# Patient Record
Sex: FEMALE | URBAN - METROPOLITAN AREA
[De-identification: names, ages, dates, MRNs, and addresses within clinical notes are randomized per-mention and may not be internally consistent; named-entity substitution may affect disease eponyms.]

---

## 2022-03-29 DIAGNOSIS — Z11.59 ENCOUNTER FOR SCREENING FOR OTHER VIRAL DISEASES: Primary | ICD-10-CM

## 2022-03-30 RX ORDER — MEDROXYPROGESTERONE ACETATE 10 MG
20 TABLET ORAL 2 TIMES DAILY
COMMUNITY

## 2022-03-30 RX ORDER — CETIRIZINE HYDROCHLORIDE 10 MG/1
10 TABLET ORAL DAILY
COMMUNITY

## 2022-03-30 RX ORDER — ALBUTEROL SULFATE 90 UG/1
2 AEROSOL, METERED RESPIRATORY (INHALATION) EVERY 6 HOURS
COMMUNITY

## 2022-03-30 RX ORDER — LEVOTHYROXINE SODIUM 150 UG/1
150 TABLET ORAL DAILY
COMMUNITY

## 2022-04-02 ENCOUNTER — LAB (OUTPATIENT)
Dept: LAB | Facility: CLINIC | Age: 52
End: 2022-04-02
Attending: STUDENT IN AN ORGANIZED HEALTH CARE EDUCATION/TRAINING PROGRAM
Payer: COMMERCIAL

## 2022-04-02 DIAGNOSIS — Z11.59 ENCOUNTER FOR SCREENING FOR OTHER VIRAL DISEASES: ICD-10-CM

## 2022-04-02 PROCEDURE — U0005 INFEC AGEN DETEC AMPLI PROBE: HCPCS

## 2022-04-02 PROCEDURE — U0003 INFECTIOUS AGENT DETECTION BY NUCLEIC ACID (DNA OR RNA); SEVERE ACUTE RESPIRATORY SYNDROME CORONAVIRUS 2 (SARS-COV-2) (CORONAVIRUS DISEASE [COVID-19]), AMPLIFIED PROBE TECHNIQUE, MAKING USE OF HIGH THROUGHPUT TECHNOLOGIES AS DESCRIBED BY CMS-2020-01-R: HCPCS

## 2022-04-03 LAB — SARS-COV-2 RNA RESP QL NAA+PROBE: NEGATIVE

## 2022-04-04 ENCOUNTER — ANESTHESIA EVENT (OUTPATIENT)
Dept: SURGERY | Facility: AMBULATORY SURGERY CENTER | Age: 52
End: 2022-04-04

## 2022-04-04 NOTE — PROGRESS NOTES
"Preoperative Note     50yo with persistent PMB presenting for scheduled hysteroscopy, D&C insertion.   Patient with persistent postmenopausal bleeding for 2-3 months. EMB benign with  endometrium. Refractory to PO progesterone.   Patient no longer using hormone pellets, last insertion was 9/2021.   TVUS: Uterus 5.07 x 3.93 x 9cm, EE 12.83mm - thickened and heterogeneous.    PMH: asthma, well controlled; h/o thyroid dysfunction now normal  All: NKDA    PE  /67   Temp 97.5  F (36.4  C) (Temporal)   Resp 16   Ht 1.626 m (5' 4\")   Wt 86.6 kg (191 lb)   SpO2 98%   Breastfeeding No   BMI 32.79 kg/m    Gen: well appearing  Resp: CTA b/l  CV: RRR  Abd: soft, NT, ND    Risks/benefits/alternatives to today's procedure reviewed, informed consent obtained.   Plan for hysteroscopy/D&C. No longer wants LNG-IUD. Will taper her PO progesterone.     Le Cornell MD    "

## 2022-04-05 ENCOUNTER — HOSPITAL ENCOUNTER (OUTPATIENT)
Facility: AMBULATORY SURGERY CENTER | Age: 52
Discharge: HOME | End: 2022-04-05
Attending: STUDENT IN AN ORGANIZED HEALTH CARE EDUCATION/TRAINING PROGRAM
Payer: COMMERCIAL

## 2022-04-05 ENCOUNTER — ANESTHESIA (OUTPATIENT)
Dept: SURGERY | Facility: AMBULATORY SURGERY CENTER | Age: 52
End: 2022-04-05

## 2022-04-05 VITALS
WEIGHT: 191 LBS | SYSTOLIC BLOOD PRESSURE: 125 MMHG | TEMPERATURE: 97 F | BODY MASS INDEX: 32.61 KG/M2 | HEART RATE: 75 BPM | DIASTOLIC BLOOD PRESSURE: 69 MMHG | HEIGHT: 64 IN | OXYGEN SATURATION: 96 % | RESPIRATION RATE: 16 BRPM

## 2022-04-05 DIAGNOSIS — N95.0 PMB (POSTMENOPAUSAL BLEEDING): ICD-10-CM

## 2022-04-05 RX ORDER — MEPERIDINE HYDROCHLORIDE 25 MG/ML
12.5 INJECTION INTRAMUSCULAR; INTRAVENOUS; SUBCUTANEOUS
Status: DISCONTINUED | OUTPATIENT
Start: 2022-04-05 | End: 2022-04-06 | Stop reason: HOSPADM

## 2022-04-05 RX ORDER — PROPOFOL 10 MG/ML
INJECTION, EMULSION INTRAVENOUS CONTINUOUS PRN
Status: DISCONTINUED | OUTPATIENT
Start: 2022-04-05 | End: 2022-04-05

## 2022-04-05 RX ORDER — FENTANYL CITRATE 0.05 MG/ML
25 INJECTION, SOLUTION INTRAMUSCULAR; INTRAVENOUS EVERY 5 MIN PRN
Status: DISCONTINUED | OUTPATIENT
Start: 2022-04-05 | End: 2022-04-06 | Stop reason: HOSPADM

## 2022-04-05 RX ORDER — FENTANYL CITRATE 50 UG/ML
INJECTION, SOLUTION INTRAMUSCULAR; INTRAVENOUS PRN
Status: DISCONTINUED | OUTPATIENT
Start: 2022-04-05 | End: 2022-04-05

## 2022-04-05 RX ORDER — LIDOCAINE 40 MG/G
CREAM TOPICAL
Status: DISCONTINUED | OUTPATIENT
Start: 2022-04-05 | End: 2022-04-06 | Stop reason: HOSPADM

## 2022-04-05 RX ORDER — ACETAMINOPHEN 325 MG/1
975 TABLET ORAL ONCE
Status: DISCONTINUED | OUTPATIENT
Start: 2022-04-05 | End: 2022-04-06 | Stop reason: HOSPADM

## 2022-04-05 RX ORDER — HYDROMORPHONE HCL IN WATER/PF 6 MG/30 ML
0.2 PATIENT CONTROLLED ANALGESIA SYRINGE INTRAVENOUS EVERY 5 MIN PRN
Status: DISCONTINUED | OUTPATIENT
Start: 2022-04-05 | End: 2022-04-06 | Stop reason: HOSPADM

## 2022-04-05 RX ORDER — FENTANYL CITRATE 0.05 MG/ML
25 INJECTION, SOLUTION INTRAMUSCULAR; INTRAVENOUS
Status: DISCONTINUED | OUTPATIENT
Start: 2022-04-05 | End: 2022-04-06 | Stop reason: HOSPADM

## 2022-04-05 RX ORDER — IBUPROFEN 800 MG/1
800 TABLET, FILM COATED ORAL EVERY 6 HOURS PRN
Qty: 30 TABLET | Refills: 0 | Status: SHIPPED | OUTPATIENT
Start: 2022-04-05

## 2022-04-05 RX ORDER — OXYCODONE HYDROCHLORIDE 5 MG/1
5 TABLET ORAL
Status: DISCONTINUED | OUTPATIENT
Start: 2022-04-05 | End: 2022-04-06 | Stop reason: HOSPADM

## 2022-04-05 RX ORDER — SODIUM CHLORIDE, SODIUM LACTATE, POTASSIUM CHLORIDE, CALCIUM CHLORIDE 600; 310; 30; 20 MG/100ML; MG/100ML; MG/100ML; MG/100ML
INJECTION, SOLUTION INTRAVENOUS CONTINUOUS
Status: DISCONTINUED | OUTPATIENT
Start: 2022-04-05 | End: 2022-04-06 | Stop reason: HOSPADM

## 2022-04-05 RX ORDER — ONDANSETRON 2 MG/ML
4 INJECTION INTRAMUSCULAR; INTRAVENOUS EVERY 30 MIN PRN
Status: DISCONTINUED | OUTPATIENT
Start: 2022-04-05 | End: 2022-04-06 | Stop reason: HOSPADM

## 2022-04-05 RX ORDER — ACETAMINOPHEN 325 MG/1
975 TABLET ORAL EVERY 6 HOURS PRN
Qty: 50 TABLET | Refills: 0 | Status: SHIPPED | OUTPATIENT
Start: 2022-04-05

## 2022-04-05 RX ORDER — KETAMINE HYDROCHLORIDE 50 MG/ML
INJECTION, SOLUTION INTRAMUSCULAR; INTRAVENOUS PRN
Status: DISCONTINUED | OUTPATIENT
Start: 2022-04-05 | End: 2022-04-05

## 2022-04-05 RX ORDER — ACETAMINOPHEN 325 MG/1
975 TABLET ORAL ONCE
Status: COMPLETED | OUTPATIENT
Start: 2022-04-05 | End: 2022-04-05

## 2022-04-05 RX ORDER — ONDANSETRON 2 MG/ML
INJECTION INTRAMUSCULAR; INTRAVENOUS PRN
Status: DISCONTINUED | OUTPATIENT
Start: 2022-04-05 | End: 2022-04-05

## 2022-04-05 RX ORDER — IBUPROFEN 800 MG/1
800 TABLET, FILM COATED ORAL ONCE
Status: DISCONTINUED | OUTPATIENT
Start: 2022-04-05 | End: 2022-04-06 | Stop reason: HOSPADM

## 2022-04-05 RX ORDER — DEXAMETHASONE SODIUM PHOSPHATE 4 MG/ML
INJECTION, SOLUTION INTRA-ARTICULAR; INTRALESIONAL; INTRAMUSCULAR; INTRAVENOUS; SOFT TISSUE PRN
Status: DISCONTINUED | OUTPATIENT
Start: 2022-04-05 | End: 2022-04-05

## 2022-04-05 RX ORDER — ONDANSETRON 4 MG/1
4 TABLET, ORALLY DISINTEGRATING ORAL EVERY 30 MIN PRN
Status: DISCONTINUED | OUTPATIENT
Start: 2022-04-05 | End: 2022-04-06 | Stop reason: HOSPADM

## 2022-04-05 RX ORDER — KETOROLAC TROMETHAMINE 30 MG/ML
INJECTION, SOLUTION INTRAMUSCULAR; INTRAVENOUS PRN
Status: DISCONTINUED | OUTPATIENT
Start: 2022-04-05 | End: 2022-04-05

## 2022-04-05 RX ORDER — LIDOCAINE HYDROCHLORIDE 10 MG/ML
INJECTION, SOLUTION EPIDURAL; INFILTRATION; INTRACAUDAL; PERINEURAL PRN
Status: DISCONTINUED | OUTPATIENT
Start: 2022-04-05 | End: 2022-04-05 | Stop reason: HOSPADM

## 2022-04-05 RX ORDER — OXYCODONE HYDROCHLORIDE 5 MG/1
5 TABLET ORAL EVERY 4 HOURS PRN
Status: DISCONTINUED | OUTPATIENT
Start: 2022-04-05 | End: 2022-04-06 | Stop reason: HOSPADM

## 2022-04-05 RX ORDER — LIDOCAINE HYDROCHLORIDE 20 MG/ML
INJECTION, SOLUTION INFILTRATION; PERINEURAL PRN
Status: DISCONTINUED | OUTPATIENT
Start: 2022-04-05 | End: 2022-04-05

## 2022-04-05 RX ADMIN — DEXAMETHASONE SODIUM PHOSPHATE 6 MG: 4 INJECTION, SOLUTION INTRA-ARTICULAR; INTRALESIONAL; INTRAMUSCULAR; INTRAVENOUS; SOFT TISSUE at 07:35

## 2022-04-05 RX ADMIN — SODIUM CHLORIDE, SODIUM LACTATE, POTASSIUM CHLORIDE, CALCIUM CHLORIDE: 600; 310; 30; 20 INJECTION, SOLUTION INTRAVENOUS at 06:52

## 2022-04-05 RX ADMIN — PROPOFOL 200 MCG/KG/MIN: 10 INJECTION, EMULSION INTRAVENOUS at 07:35

## 2022-04-05 RX ADMIN — ACETAMINOPHEN 975 MG: 325 TABLET ORAL at 06:53

## 2022-04-05 RX ADMIN — KETAMINE HYDROCHLORIDE 10 MG: 50 INJECTION, SOLUTION INTRAMUSCULAR; INTRAVENOUS at 07:35

## 2022-04-05 RX ADMIN — FENTANYL CITRATE 50 MCG: 50 INJECTION, SOLUTION INTRAMUSCULAR; INTRAVENOUS at 07:35

## 2022-04-05 RX ADMIN — KETOROLAC TROMETHAMINE 15 MG: 30 INJECTION, SOLUTION INTRAMUSCULAR; INTRAVENOUS at 08:01

## 2022-04-05 RX ADMIN — LIDOCAINE HYDROCHLORIDE 3 ML: 20 INJECTION, SOLUTION INFILTRATION; PERINEURAL at 07:35

## 2022-04-05 RX ADMIN — ONDANSETRON 4 MG: 2 INJECTION INTRAMUSCULAR; INTRAVENOUS at 07:40

## 2022-04-05 NOTE — ANESTHESIA CARE TRANSFER NOTE
Patient: Naima Llamas    Procedure: Procedure(s):  HYSTEROSCOPY, WITH DILATION AND CURETTAGE       Diagnosis: PMB (postmenopausal bleeding) [N95.0]  Diagnosis Additional Information: No value filed.    Anesthesia Type:   MAC     Note:    Oropharynx: oropharynx clear of all foreign objects and spontaneously breathing  Level of Consciousness: awake  Oxygen Supplementation: room air    Independent Airway: airway patency satisfactory and stable  Dentition: dentition unchanged  Vital Signs Stable: post-procedure vital signs reviewed and stable  Report to RN Given: handoff report given  Patient transferred to: Phase II    Handoff Report: Identifed the Patient, Identified the Reponsible Provider, Reviewed the pertinent medical history, Discussed the surgical course, Reviewed Intra-OP anesthesia mangement and issues during anesthesia, Set expectations for post-procedure period and Allowed opportunity for questions and acknowledgement of understanding      Vitals:  Vitals Value Taken Time   /67 04/05/22 0807   Temp 97  F (36.1  C) 04/05/22 0807   Pulse 73 04/05/22 0810   Resp 16 04/05/22 0807   SpO2 97 % 04/05/22 0810   Vitals shown include unvalidated device data.    Electronically Signed By: MYRA Schmitz CRNA  April 5, 2022  8:12 AM

## 2022-04-05 NOTE — ANESTHESIA PREPROCEDURE EVALUATION
Anesthesia Pre-Procedure Evaluation    Patient: Naima Llamas   MRN: [id number] : 1970        Procedure : Procedure(s):  HYSTEROSCOPY, WITH DILATION AND CURETTAGE          Past Medical History:   Diagnosis Date     Dyspnea on exertion      Thyroid disease      Uncomplicated asthma       Past Surgical History:   Procedure Laterality Date     APPENDECTOMY       GI SURGERY       GYN SURGERY       ORTHOPEDIC SURGERY        No Known Allergies   Social History     Tobacco Use     Smoking status: Never Smoker     Smokeless tobacco: Never Used   Substance Use Topics     Alcohol use: Yes     Comment: occas.      Wt Readings from Last 1 Encounters:   22 86.6 kg (191 lb)        Anesthesia Evaluation   Pt has had prior anesthetic. Type: General.    No history of anesthetic complications       ROS/MED HX  ENT/Pulmonary:  - neg pulmonary ROS   (+) Intermittent, asthma (exercise-induced; last inhaler needed 2 years ago) Treatment: Inhaler prn,      Neurologic:  - neg neurologic ROS     Cardiovascular:  - neg cardiovascular ROS     METS/Exercise Tolerance:     Hematologic:  - neg hematologic  ROS     Musculoskeletal:  - neg musculoskeletal ROS     GI/Hepatic:  - neg GI/hepatic ROS     Renal/Genitourinary:  - neg Renal ROS     Endo:  - neg endo ROS   (+) thyroid problem, hypothyroidism, Obesity (bmi 33),     Psychiatric/Substance Use:  - neg psychiatric ROS     Infectious Disease:  - neg infectious disease ROS     Malignancy:  - neg malignancy ROS     Other:  - neg other ROS          Physical Exam    Airway        Mallampati: II   TM distance: > 3 FB   Neck ROM: full   Mouth opening: > 3 cm    Respiratory Devices and Support         Dental  no notable dental history         Cardiovascular   cardiovascular exam normal          Pulmonary   pulmonary exam normal                OUTSIDE LABS:  CBC: No results found for: WBC, HGB, HCT, PLT  BMP: No results found for: NA, POTASSIUM, CHLORIDE, CO2, BUN, CR, GLC  COAGS: No  results found for: PTT, INR, FIBR  POC: No results found for: BGM, HCG, HCGS  HEPATIC: No results found for: ALBUMIN, PROTTOTAL, ALT, AST, GGT, ALKPHOS, BILITOTAL, BILIDIRECT, MARIAH  OTHER: No results found for: PH, LACT, A1C, SINAN, PHOS, MAG, LIPASE, AMYLASE, TSH, T4, T3, CRP, SED    Anesthesia Plan    ASA Status:  2   NPO Status:  NPO Appropriate    Anesthesia Type: MAC.     - Reason for MAC: immobility needed, straight local not clinically adequate   Induction: Propofol.   Maintenance: TIVA.        Consents    Anesthesia Plan(s) and associated risks, benefits, and realistic alternatives discussed. Questions answered and patient/representative(s) expressed understanding.    - Discussed:     - Discussed with:  Patient         Postoperative Care    Pain management: Multi-modal analgesia.   PONV prophylaxis: Ondansetron (or other 5HT-3), Dexamethasone or Solumedrol     Comments:    Other Comments: Toradol if OK with surgeon    Reviewed anesthetic options and risks. Patient agrees to proceed.     Recent Results (from the past 120 hour(s))  -Asymptomatic COVID-19 Virus (Coronavirus) by PCR Nose:   Collection Time: 04/02/22  9:51 AM  Specimen: Nose; Swab       Result                                            Value                         Ref Range                       SARS CoV2 PCR                                     Negative                      Negative                             William Pope MD

## 2022-04-05 NOTE — ANESTHESIA POSTPROCEDURE EVALUATION
Patient: Naima Llamas    Procedure: Procedure(s):  HYSTEROSCOPY, WITH DILATION AND CURETTAGE       Anesthesia Type:  MAC    Note:  Disposition: Outpatient   Postop Pain Control: Uneventful            Sign Out: Well controlled pain   PONV: No   Neuro/Psych: Uneventful            Sign Out: Acceptable/Baseline neuro status   Airway/Respiratory: Uneventful            Sign Out: Acceptable/Baseline resp. status   CV/Hemodynamics: Uneventful            Sign Out: Acceptable CV status; No obvious hypovolemia; No obvious fluid overload   Other NRE: NONE   DID A NON-ROUTINE EVENT OCCUR? No           Last vitals:  Vitals Value Taken Time   /68 04/05/22 0815   Temp 97  F (36.1  C) 04/05/22 0807   Pulse 79 04/05/22 0823   Resp 16 04/05/22 0807   SpO2 98 % 04/05/22 0823   Vitals shown include unvalidated device data.    Electronically Signed By: William Pope MD  April 5, 2022  8:26 AM

## 2022-04-05 NOTE — OP NOTE
"Operative Report    Patient: Naima Llamas    MRN: [id number]    CSN: [id number]    4/5/2022    Procedure date: 4/5/2022    Preoperative Diagnosis:    1. 52yo with persistent postmenopausal bleeding, with benign endometrial biopsy. Bleeding refractory to PO progesterone.     Postoperative Diagnosis: Same    Procedure(s): Procedure(s):  HYSTEROSCOPY, WITH DILATION AND CURETTAGE    Attending Surgeon: Le Cornell MD    Assistant(s): Circulator: Vale Burton RN  Relief Circulator: Naima Knight RN  Relief Scrub: Camila Foley  Scrub Person: SHALOM CISNEROS    Anesthesia: Monitor Anesthesia Care    EBL: 20mL    Fluids: see anesthesia record   UOP: 20mL    Fluid deficit: 200ml    Description of findings:  EUA revealed normal size anteverted uterus, no adnexal masses.  Hysteroscopy demonstrated normal ostia bilaterally, \"fluffy\" appearing proliferative endometrium but otherwise normal appearing, no polyps or fibroid    Specimens submitted:  ID Type Source Tests Collected by Time Destination   1 :  Curettings Endometrium SURGICAL PATHOLOGY EXAM Le Cornell MD 4/5/2022  7:52 AM        Disposition:Stable to PACU     Complications: none    Description of Operation:    The patient was explained the procedure. All risks, benefits, alternatives were discussed. The consent form was signed with a witness present.    The patient was taken to the OR where SCDs were placed and turned on. General anesthesia was induced without difficulty. The patient was then placed in dorsal lithotomy position using Brayden stirrups. She was examined for the above noted findings. Then, the patient was prepped and draped in usual sterile fashion.    The bladder was drained with a straight catheter. Van retractors were used to identify the cervix. Singled toothed tenaculum was placed on the anterior lip of the cervix. The cervix was dilated to accomodate an operative hysteroscope sequentially using Gianna dilators. An " operative hysteroscope was carefully introduced through the cervix to the uterus under direct visualization. Sterile saline was used a medium for visualization of the uterine cavity. Both ostia were visualized and appeared normal. A global endometrial resection was performed using the Myosure lite until the endometrium was no longer fluffy. The hysteroscope was removed and sharp curettage was performed until a gritty texture was appreciated in all quadrants. Next the tenaculum was removed from the cervix and the sites were found to be hemostatic. All instruments were then removed from the vagina.    Ins and outs were noted. All needle, instrument, and lap sponge count correct x 2. The procedure was overall without complication. The patient was repositioned to dorsal supine position, extubated without event and taken to the recovery room in stable condition.    Le Cornell MD

## 2022-04-05 NOTE — DISCHARGE INSTRUCTIONS
Plan for oral progesterone (medroxoprogesterone): take 20mg twice per day for 2 days, then 10mg twice per day for 2 days, then 10mg daily for 2 days, then can discontinue.     If you have any questions or concerns regarding your procedure, please contact Dr. Cornell, her office number is [phone].    You received 975 mg of acetaminophen (Tylenol) at 6:53 AM. Please do not take an additional dose of Tylenol until after 12:53 PM.    Do not exceed 4,000 mg of acetaminophen during a 24 hour period and keep in mind that acetaminophen can also be found in many over-the-counter cold medications as well as narcotics that may be given for pain.    You received a dose of IV Toradol at 8:01AM. Please do not take any additional Ibuprofen products (Aleve/Advil/Motrin/Naproxen/Celebrex) until after 2:00PM.      Discharge Instructions for Patients Undergoing D & C (Suction D & C) or Laparoscopy, Hysteroscopy, LEEP or Cold Knife Cone Biopsy     Activity:  You may resume normal activities as your abdominal discomfort disappears.  You may expect some discomfort under the ribs and shoulder area for the first 24 hours.  In nearly all cases, this will disappear shortly after the first day.  It is certainly permissible to climb stairs, and do ordinary, quiet activities.  More vigorous activities such as sports, and work may be resumed in 48 - 72 hours as seems to befit your situation.  You should not bathe for one week, but you may shower any time.     Office Visit:  Please call a day or two after your surgery to make an appointment in 1 - 2 weeks to discuss the results of your surgery and have your check-up.       When to call your healthcare provider:     Vaginal Discharge:  You may have some bloody vaginal discharge for as long as one week.  Pads may be used any time.  Avoid placing anything in your vagina (tampons, douches, intercourse) for two weeks.  Temperature:  If you develop a temperature elevation of 101.5 degrees or  higher, please call immediately or go to the Emergency Room if after office hours.  Restrictions:  Due to the effects of general anesthesia, please do not drive a car, drink alcoholic beverages, or operate complex machinery in the first 24 hours following surgery.     If you develop any vomiting, or abdominal pain that is worsening over time, or any vaginal bleeding heavier than usual menstrual period (more than one pad change each hour), please contact Dr. Cornell, her office number is [phone].    Discharge Instructions: After Your Surgery, regarding Anesthesia    You ve just had surgery. During surgery, you were given medicine called anesthesia to keep you relaxed and free of pain. After surgery, you may have some pain or nausea. This is common. Here are some tips for feeling better and getting well after surgery.    Going home    Your healthcare provider will show you how to take care of yourself when you go home. He or she will also answer your questions. Have an adult family member or friend drive you home. For the first 24 hours after your surgery:      Don't drive or use heavy equipment.    Don't make important decisions or sign legal papers.    Don't drink alcohol.    Have someone stay with you for the next 24 hours. He or she can watch for problems and help keep you safe.  Be sure to go to all follow-up visits with your healthcare provider. And rest after your surgery for as long as your healthcare provider tells you to.    Coping with pain    *Pain after surgery is normal and expected*    If you have pain after surgery, pain medicine will help you feel better. Take it as told, before pain becomes severe. Also, ask your healthcare provider or pharmacist about other ways to control pain. This might be with heat, ice, or relaxation. And follow any other instructions your surgeon or nurse gives you.    Tips for taking pain medicine    To get the best relief possible, remember these points:      Pain  medicines can upset your stomach. Taking them with a little food may help.    Most pain relievers taken by mouth need at least 20 to 30 minutes to start to work.    Don't wait till your pain becomes severe before you take your medicine. Try to time your medicine so that you can take it before starting an activity. This might be before you get dressed, go for a walk, or sit down for dinner.    Constipation is a common side effect of pain medicines. You can take medicines such as laxatives (Miralax) or stool softeners to help ease constipation. Drinking lots of fluids and eating foods such as fruits and vegetables that are high in fiber can also help.    Drinking alcohol and taking pain medicine can cause dizziness and slow your breathing. It can even be deadly. Don't drink alcohol while taking pain medicine.    Pain medicine can make you react more slowly to things. Don't drive or run machinery while taking pain medicine.  Your healthcare provider may tell you to take acetaminophen to help ease your pain. Ask him or her how much you are supposed to take each day. Acetaminophen or other pain relievers may interact with your prescription medicines or other over-the-counter (OTC) medicines. Some prescription medicines have acetaminophen and other ingredients. Using both prescription and OTC acetaminophen for pain can cause you to overdose. Read the labels on your OTC medicines with care. This will help you to clearly know the list of ingredients, how much to take, and any warnings. It may also help you not take too much acetaminophen. If you have questions or don't understand the information, ask your pharmacist or healthcare provider to explain it to you before you take the OTC medicine.    Managing nausea    Some people have an upset stomach after surgery. This is often because of anesthesia, pain, or pain medicine, or the stress of surgery. These tips will help you handle nausea and eat healthy foods as you get  better. If you were on a special food plan before surgery, ask your healthcare provider if you should follow it while you get better. These tips may help:      Don't push yourself to eat. Your body will tell you when to eat and how much.    Start off with clear liquids and soup. They are easier to digest.    Next try semi-solid foods, such as mashed potatoes, applesauce, and gelatin, as you feel ready.    Slowly move to solid foods. Don t eat fatty, rich, or spicy foods at first.    Don't force yourself to have 3 large meals a day. Instead eat smaller amounts more often.    Take pain medicines with a small amount of solid food, such as crackers or toast, to prevent nausea.    If you have obstructive sleep apnea    You were given anesthesia medicine during surgery to keep you comfortable and free of pain. After surgery, you may have more apnea spells because of this medicine and other medicines you were given. The spells may last longer than usual.   At home:      Keep using the continuous positive airway pressure (CPAP) device when you sleep. Unless your healthcare provider tells you not to, use it when you sleep, day or night. CPAP is a common device used to treat obstructive sleep apnea.    Talk with your provider before taking any pain medicine, muscle relaxants, or sedatives. Your provider will tell you about the possible dangers of taking these medicines.